# Patient Record
Sex: MALE | Race: WHITE | NOT HISPANIC OR LATINO | Employment: FULL TIME | URBAN - METROPOLITAN AREA
[De-identification: names, ages, dates, MRNs, and addresses within clinical notes are randomized per-mention and may not be internally consistent; named-entity substitution may affect disease eponyms.]

---

## 2017-09-08 ENCOUNTER — OFFICE VISIT (OUTPATIENT)
Dept: NEUROLOGY | Facility: MEDICAL CENTER | Age: 32
End: 2017-09-08
Payer: COMMERCIAL

## 2017-09-08 VITALS
HEIGHT: 72 IN | BODY MASS INDEX: 28.01 KG/M2 | RESPIRATION RATE: 16 BRPM | WEIGHT: 206.8 LBS | SYSTOLIC BLOOD PRESSURE: 114 MMHG | OXYGEN SATURATION: 97 % | DIASTOLIC BLOOD PRESSURE: 72 MMHG | TEMPERATURE: 98.3 F | HEART RATE: 82 BPM

## 2017-09-08 PROCEDURE — 99204 OFFICE O/P NEW MOD 45 MIN: CPT | Performed by: NURSE PRACTITIONER

## 2017-09-08 ASSESSMENT — PATIENT HEALTH QUESTIONNAIRE - PHQ9: CLINICAL INTERPRETATION OF PHQ2 SCORE: 0

## 2017-09-08 ASSESSMENT — PAIN SCALES - GENERAL: PAINLEVEL: NO PAIN

## 2017-09-08 NOTE — PROGRESS NOTES
Subjective:      Sanjeev Pop is a 31 y.o. male who presents with New Patient (Migraines)        HPI   History of headaches as child.  Missed a lot of school due to headaches.    Headaches are occurring at least 20 days of the month.     Typically triggered by stress but becomes stressed by the migraine in a reverse cycle.  Triggered by the sunshine as well.    Typical headache: no aura, located in the neck at onset.  Photophobia, phonophobia, short tempered.  Nausea, vomiting.    Prophylaxis: Topamax, amitriptyline, propranolol.    Abortive: ibuprofen 800mg tablet    Family history: PGF, father.    Medical history: none  Surgical history: none    Lives in [place], [place] no children.    Education: some college.  Occupation: quality and , full-time.    Caffeine: average one soda or iced tea.  Smokes: none  Alcohol: none    Current Outpatient Prescriptions   Medication Sig Dispense Refill   • Acetaminophen (TYLENOL) 325 MG Cap Take  by mouth.     • antipyrine-benzocaine (AURALGAN) otic solution Place 1-4 Drops in ear every 2 hours as needed for Mild Pain. (Patient not taking: Reported on 9/8/2017) 1 Bottle 0   • hydrocod polst-chlorphen polst (TUSSIONEX PENNKINETIC ER) 10-8 MG/5ML LQCR Take 5 mL by mouth every 12 hours. (Patient not taking: Reported on 9/8/2017) 60 mL 0   • ondansetron (ZOFRAN) 2 MG/ML injection 4 mg by Intravenous route Once.         No current facility-administered medications for this visit.          Review of Systems   Constitutional: Negative.    HENT: Negative for hearing loss, nosebleeds and sore throat.         No recent head injury.   Eyes: Negative for double vision.        No new loss of vision.   Respiratory: Negative for cough.         No recent lung infections.   Cardiovascular: Negative for chest pain.   Gastrointestinal: Negative for abdominal pain, diarrhea, nausea and vomiting.   Genitourinary: Negative.    Musculoskeletal: Negative.    Skin: Negative.    Neurological:  Positive for headaches. Negative for dizziness and seizures.   Endo/Heme/Allergies:        No history of endocrine dysfunction.  No new problems.   Psychiatric/Behavioral: Negative for depression. The patient is not nervous/anxious.         No recent mood changes.          Objective:     /72   Pulse 82   Temp 36.8 °C (98.3 °F)   Resp 16   Ht 1.829 m (6')   Wt 93.8 kg (206 lb 12.8 oz)   SpO2 97%   BMI 28.05 kg/m²      Physical Exam   Constitutional: He is oriented to person, place, and time. He appears well-developed and well-nourished. No distress.   HENT:   Head: Normocephalic and atraumatic.   Nose: Nose normal.   Eyes: Pupils are equal, round, and reactive to light.   Neck: Normal range of motion. Neck supple.   Cardiovascular: Normal rate and regular rhythm.  Exam reveals no gallop and no friction rub.    No murmur heard.  Pulmonary/Chest: Effort normal. No respiratory distress.   Abdominal: Soft.   Musculoskeletal: Normal range of motion.   Lymphadenopathy:     He has no cervical adenopathy.   Neurological: He is alert and oriented to person, place, and time. He exhibits normal muscle tone. Coordination normal.   Reflex Scores:       Tricep reflexes are 2+ on the right side and 2+ on the left side.       Bicep reflexes are 2+ on the right side and 2+ on the left side.       Brachioradialis reflexes are 2+ on the right side and 2+ on the left side.       Patellar reflexes are 2+ on the right side and 2+ on the left side.       Achilles reflexes are 2+ on the right side and 2+ on the left side.  Pleasant, right-handed.  CN II: Fundi normal, visual fields full to confrontation.  CN III, IV, VI: Pupils equal, round and reactive to light.  Extraocular movements full and intact in horizontal and vertical gaze.  CN V: Normal in motor and sensory modalities.  CN VII: No evidence of facial asymmetry.  CN VIII: Hearing grossly intact.  CN IX, X: Palate elevates symmetrically and in the midline.  CN XI:  Normal sternocleidomastoid strength.  CN XII: Tongue is in the midline.    Motor: Normal muscle bulk and tone, with full and symmetric strength.  Sensory: Intact to light touch and tickle.  Cerebellar/Coordination: Normal reach and grasp.  Gait: Stands well on toes.  Walks with assist.    Skin: Skin is warm and dry.   Psychiatric: He has a normal mood and affect.           Assessment/Plan:     Migraine headaches:  Long-standing history of headaches.  Occurring >20 days per month.  Unrelieved by OTC medications.    Neurological examination is normal and non-focal.    Ms Pop has chronic daily migraines, defined as having 15 or more headaches days per month over a minimum of the last three months. Episodes last more than 4 hours (if untreated). Previous treatments for at least three months have included beta-blockers such as propranolol, anti-epileptics such as Topamax, NSAIDs such as naproxen, anti-depressants such as  amitriptyline, and triptans. The patients has not responded to any of these treatments. At this point the only option left would be to use BTX injections for chronic migraine.    Return for follow-up for initiation of Botox.

## 2017-09-11 ASSESSMENT — ENCOUNTER SYMPTOMS
CONSTITUTIONAL NEGATIVE: 1
ABDOMINAL PAIN: 0
DEPRESSION: 0
COUGH: 0
DOUBLE VISION: 0
DIZZINESS: 0
NAUSEA: 0
HEADACHES: 1
NERVOUS/ANXIOUS: 0
SORE THROAT: 0
SEIZURES: 0
VOMITING: 0
DIARRHEA: 0
MUSCULOSKELETAL NEGATIVE: 1

## 2017-09-18 DIAGNOSIS — G43.709 CHRONIC MIGRAINE W/O AURA W/O STATUS MIGRAINOSUS, NOT INTRACTABLE: ICD-10-CM

## 2017-11-17 ENCOUNTER — OFFICE VISIT (OUTPATIENT)
Dept: NEUROLOGY | Facility: MEDICAL CENTER | Age: 32
End: 2017-11-17
Payer: COMMERCIAL

## 2017-11-17 VITALS
DIASTOLIC BLOOD PRESSURE: 78 MMHG | HEART RATE: 83 BPM | OXYGEN SATURATION: 98 % | TEMPERATURE: 98.6 F | HEIGHT: 72 IN | WEIGHT: 221.4 LBS | BODY MASS INDEX: 29.99 KG/M2 | SYSTOLIC BLOOD PRESSURE: 100 MMHG

## 2017-11-17 DIAGNOSIS — G43.709 CHRONIC MIGRAINE W/O AURA W/O STATUS MIGRAINOSUS, NOT INTRACTABLE: ICD-10-CM

## 2017-11-17 PROCEDURE — 64615 CHEMODENERV MUSC MIGRAINE: CPT | Performed by: PHYSICIAN ASSISTANT

## 2017-11-17 NOTE — PROGRESS NOTES
I treated this patient in clinic today with BotoxA 155 units according to the dosing/injection paradigm currently mandated by the FDA for the management of chronic migraine. Specifically, I injected 5 units to the procerus, 5 units to the corrugators bilaterally, a total of 20 units to the frontalis musculature, 20 units to the temporalis bilaterally, 15 units to the occipitalis bilaterally, 10 units to the cervical paraspinals bilaterally and 15 units to the trapezius musculature bilaterally.      The remainder of the Botox 200 units mixed but not administered was discarded as wastage per FDA guidelines.

## 2018-03-02 ENCOUNTER — OFFICE VISIT (OUTPATIENT)
Dept: NEUROLOGY | Facility: MEDICAL CENTER | Age: 33
End: 2018-03-02
Payer: COMMERCIAL

## 2018-03-02 VITALS
HEIGHT: 72 IN | OXYGEN SATURATION: 95 % | TEMPERATURE: 98.6 F | SYSTOLIC BLOOD PRESSURE: 110 MMHG | BODY MASS INDEX: 30.31 KG/M2 | HEART RATE: 88 BPM | DIASTOLIC BLOOD PRESSURE: 64 MMHG | WEIGHT: 223.77 LBS

## 2018-03-02 DIAGNOSIS — G43.709 CHRONIC MIGRAINE W/O AURA W/O STATUS MIGRAINOSUS, NOT INTRACTABLE: ICD-10-CM

## 2018-03-02 PROCEDURE — 64615 CHEMODENERV MUSC MIGRAINE: CPT | Performed by: PHYSICIAN ASSISTANT

## 2018-03-02 PROCEDURE — 99214 OFFICE O/P EST MOD 30 MIN: CPT | Performed by: PHYSICIAN ASSISTANT

## 2018-03-02 RX ORDER — PROMETHAZINE HYDROCHLORIDE 25 MG/1
TABLET ORAL
Qty: 20 TAB | Refills: 11 | Status: SHIPPED | OUTPATIENT
Start: 2018-03-02 | End: 2020-02-17

## 2018-03-02 NOTE — PROGRESS NOTES
I treated this patient in clinic today with BotoxA 155 units according to the dosing/injection paradigm currently mandated by the FDA for the management of chronic migraine. Specifically, I injected 5 units to the procerus, 5 units to the corrugators bilaterally, a total of 20 units to the frontalis musculature, 20 units to the temporalis bilaterally, 15 units to the occipitalis bilaterally, 10 units to the cervical paraspinals bilaterally and 15 units to the trapezius musculature bilaterally.    Pt was advised of side effects associated with medication.    I also injected an additional 10 units into the occipital belly of the occipitofrontalis each side because pt complained of reduced efficacy or effect wearing off prior to next dose.    The remainder of the Botox 200 units mixed but not administered was discarded as wastage per FDA guidelines.

## 2018-03-02 NOTE — PATIENT INSTRUCTIONS
Acute/Rescue Medications: max use 9 days monthly - use calendar to track and bring to next visit    Triptan - try an injectible formulation - I will try and get zembrace 3 mg.  Try it at onset of neck pain.  If it isn't working within 30 minutes repeat dose and next time do 6 mg (two injections) at onset and call and tell us so we can switch the prescription  Phenergan 25 mg + 25 mg benadryl when you can sleep/knock yourself out to sleep off a headache  NSAID - OK to also take some ibuprofen or excedrin migraine  Topical medication to put on neck when it begins to flare up    Daily Preventative Treatment:  Vitamins - handout provided - begin one or all three of them  Daily medicine - to consider possibly in the future  Exercise program:      Other:  ONB and/or Trigger point injection - we will obtain authorization and call you to schedule.  Future injections can be done same day/same time as botox

## 2018-03-02 NOTE — PROGRESS NOTES
Subjective:      Sanjeev Pop is a 32 y.o. male who presents with Follow-Up (NU2U migraines)    Chief Complaint/Reason for referral:  headaches    History of Present Illness:   Describe your headaches to me: migraine headaches since elementary school.  Now migraines typically right back of head - always there.  Moderate to severe intensity, pulsing and throbbing pain, nausea and vomiting, light and sound sensitivity.  Worsened with activity and lasting more than 4 hours untreated.    Do you get an aura or any symptoms that typically begin PRIOR to headache onset?  none    Are you nauseated or sick to your stomach when you have a headache?  y  Does light bother you when you have a headache?   ____y_____  Does sound or noise bother you when you have a headache?   ____y_____    Neurologic symptoms with headaches (weakness, numbness, vertigo, speech changes, cognitive changes)  none      Do you have any neck or jaw pain with headaches?    No hx of neck injuries    No jaw issues    Have you identified any triggers for your headaches (dehydration, poor sleep, low blood sugar, alcohol 35% (tristan wine) chocolate 22%, cheese 9%, citrus fruit 11%)? Stress, worry, no food triggers    Do you feel restless like you want to pace around with your headaches or do you feel like lying down to make your headache feel better/less severe?  none    Do headaches start by coughing, sneezing, bending over, Valsalva maneuver, sexual activity?  none      Do headaches start shortly after you lie down to go to bed or shortly after you get up from bed in the morning?  random    Have you noticed a menstrual pattern to your headaches? n/a    Have you ever kept a headache diary?    How many days do you keep ANY type of headache in any given month?  3 or fewer days______   Between 3 and 6 days______   Between 6 and 10 days_____  Between 11 and 14 days____  15 or more headache/days per month__X___   No change yet but only one set of botox  Has severe  headaches _8___ days per month    Family members with headaches:  none    Co--morbid conditions:    Conditions that affect diagnosis and treatment:  Depression  N        Anxiety     Yes when migraine is starting        Sleep disorders:   N       Obesity  Yes BMI 30    History of TBI N            Pregnancy/family planning   N/A    Fibromyalgia   N    Social History:  Do you drink any caffeine? Yes__X___ No____   How many days per week?  2 or fewer days______  3 or more days__X____      Do you drink alcohol?  None    Do you use recreational drugs including medicinal marijuana?  none    Do you smoke cigarettes? none    What do you do for work?    at [employer]  How do your headaches affect your ability to work?  Yes lots of days    Who and where do you live? Lives alone    What is your exercise program: gym 5 times weekly    Have you had an MRI done?  Not sure    [facility] reviewed - no asthma, no kidney stones, no MI, no stroke    Medications and Allergies Reviewed     What are you taking right now for your headaches/#days per month of acute medication use:   Takes ibuprofen 800 3-4 x weekly    Tylenol  ibuprofen    Prior acute treatments:  Medication/dose/timing/route/worked or side-effects?    imitrex  maxalt  Never  Tried cambia  migranal tried  Not sure if he tried triptan nasal  Had an epipen not sure what it was but probably sumatriptan    What are you taking right now - daily - to prevent headaches?/dose    nothing    Any prior prophylactic treatments:  Medications/dose/frequency/duration of treatment/worked or side effects?    Started botox by paying for it out of pocket  Never tried a daily medicine to reduce headache frequency  Never tried                                                                                                                      ROS       Objective:     There were no vitals taken for this visit.     Physical Exam            Assessment/Plan:     Chronic  Migraine/Migraine without Aura intractable    Acute/Rescue Medications: max use 9 days monthly - use calendar to track and bring to next visit    Triptan - try an injectible formulation - I will try and get zembrace 3 mg.  Try it at onset of neck pain.  If it isn't working within 30 minutes repeat dose and next time do 6 mg (two injections) at onset and call and tell us so we can switch the prescription  Phenergan 25 mg + 25 mg benadryl when you can sleep/knock yourself out to sleep off a headache  NSAID - OK to also take some ibuprofen or excedrin migraine  Topical medication to put on neck when it begins to flare up    Daily Preventative Treatment:  Vitamins - handout provided - begin one or all three of them  Daily medicine - to consider possibly in the future    Other:  ONB and/or Trigger point injection - we will obtain authorization and call you to schedule.  Future injections can be done same day/same time as botox    Total time with this visit:   30  Minutes face-to-face with patient. More than 50% of this visit was spent educating patient on their illness and/or coordinating care, as detailed above

## 2019-03-29 ENCOUNTER — OFFICE VISIT (OUTPATIENT)
Dept: URGENT CARE | Facility: PHYSICIAN GROUP | Age: 34
End: 2019-03-29
Payer: COMMERCIAL

## 2019-03-29 ENCOUNTER — HOSPITAL ENCOUNTER (OUTPATIENT)
Facility: MEDICAL CENTER | Age: 34
End: 2019-03-29
Attending: NURSE PRACTITIONER
Payer: COMMERCIAL

## 2019-03-29 VITALS
SYSTOLIC BLOOD PRESSURE: 124 MMHG | HEART RATE: 91 BPM | WEIGHT: 225 LBS | TEMPERATURE: 98.7 F | DIASTOLIC BLOOD PRESSURE: 86 MMHG | BODY MASS INDEX: 30.48 KG/M2 | OXYGEN SATURATION: 96 % | HEIGHT: 72 IN | RESPIRATION RATE: 16 BRPM

## 2019-03-29 DIAGNOSIS — M25.50 MULTIPLE JOINT PAIN: ICD-10-CM

## 2019-03-29 DIAGNOSIS — M25.50 ARTHRALGIA, UNSPECIFIED JOINT: ICD-10-CM

## 2019-03-29 PROCEDURE — 85652 RBC SED RATE AUTOMATED: CPT

## 2019-03-29 PROCEDURE — 86038 ANTINUCLEAR ANTIBODIES: CPT

## 2019-03-29 PROCEDURE — 99214 OFFICE O/P EST MOD 30 MIN: CPT | Performed by: NURSE PRACTITIONER

## 2019-03-29 RX ORDER — KETOROLAC TROMETHAMINE 30 MG/ML
60 INJECTION, SOLUTION INTRAMUSCULAR; INTRAVENOUS ONCE
Status: COMPLETED | OUTPATIENT
Start: 2019-03-29 | End: 2019-03-29

## 2019-03-29 RX ORDER — METHYLPREDNISOLONE 4 MG/1
4 TABLET ORAL DAILY
Qty: 1 KIT | Refills: 0 | Status: SHIPPED | OUTPATIENT
Start: 2019-03-29 | End: 2020-02-17

## 2019-03-29 RX ORDER — IBUPROFEN 800 MG/1
TABLET ORAL
Refills: 3 | COMMUNITY
Start: 2019-03-21 | End: 2019-04-26

## 2019-03-29 RX ADMIN — KETOROLAC TROMETHAMINE 60 MG: 30 INJECTION, SOLUTION INTRAMUSCULAR; INTRAVENOUS at 18:14

## 2019-03-29 ASSESSMENT — ENCOUNTER SYMPTOMS
FEVER: 0
VOMITING: 0
PAIN: 1
SHORTNESS OF BREATH: 0
EYE PAIN: 0
DIZZINESS: 0
MYALGIAS: 0
NUMBNESS: 0
NAUSEA: 0
WEAKNESS: 0
HEADACHES: 0
JOINT SWELLING: 0
CHILLS: 0
SORE THROAT: 0
FATIGUE: 0

## 2019-03-29 NOTE — PROGRESS NOTES
"Subjective:   Sanjeev Pop is a 33 y.o. male who presents for Pain (patient complain of joint pain for over a month)  Patient is a 33-year-old male presents clinic today for evaluation of multiple joint pain has persisted for the past month.  Patient complaining of bilateral knee pain, right elbow pain, ankle pain.  He denies any mechanism of injury.  Patient states that pain is exacerbated not with sitting and/or walking however when he stands he feels a stretching pain behind his knee.  He has been taking ibuprofen with no relief in symptoms.  Patient states he has been active in which he used to run on a treadmill and unable to run any longer.        Pain   This is a new problem. The current episode started more than 1 month ago. The problem occurs constantly. The problem has been unchanged. Pertinent negatives include no chest pain, chills, congestion, fatigue, fever, headaches, joint swelling, myalgias, nausea, numbness, rash, sore throat, vomiting or weakness. Associated symptoms comments: Bilateral knee pain, ankle pain and right elbow pain  . The symptoms are aggravated by standing. He has tried NSAIDs for the symptoms. The treatment provided no relief.     Review of Systems   Constitutional: Negative for chills, fatigue and fever.   HENT: Negative for congestion and sore throat.    Eyes: Negative for pain.   Respiratory: Negative for shortness of breath.    Cardiovascular: Negative for chest pain.   Gastrointestinal: Negative for nausea and vomiting.   Genitourinary: Negative for hematuria.   Musculoskeletal: Positive for joint pain. Negative for joint swelling and myalgias.   Skin: Negative for rash.   Neurological: Negative for dizziness, weakness, numbness and headaches.     Allergies   Allergen Reactions   • Pcn [Penicillins]       Objective:   /86   Pulse 91   Temp 37.1 °C (98.7 °F) (Temporal)   Resp 16   Ht 1.829 m (6' 0.01\")   Wt 102.1 kg (225 lb)   SpO2 96%   BMI 30.51 kg/m² "   Physical Exam   Constitutional: He is oriented to person, place, and time. He appears well-developed and well-nourished. No distress.   HENT:   Head: Normocephalic and atraumatic.   Eyes: Pupils are equal, round, and reactive to light. Conjunctivae and EOM are normal.   Cardiovascular: Normal rate and regular rhythm.    No murmur heard.  Pulmonary/Chest: Effort normal and breath sounds normal. No respiratory distress.   Abdominal: Soft. He exhibits no distension. There is no tenderness.   Musculoskeletal:        Right elbow: He exhibits normal range of motion and no swelling. No tenderness found. No radial head, no medial epicondyle, no lateral epicondyle and no olecranon process tenderness noted.        Right knee: He exhibits normal range of motion, no swelling, normal patellar mobility and no bony tenderness. No tenderness found. No medial joint line and no lateral joint line tenderness noted.        Left knee: He exhibits normal range of motion, no swelling and normal patellar mobility. No tenderness found. No medial joint line and no lateral joint line tenderness noted.        Right ankle: He exhibits normal range of motion and no swelling. No tenderness. No lateral malleolus tenderness found. Achilles tendon normal.        Left ankle: He exhibits normal range of motion and no swelling. No tenderness. No lateral malleolus and no medial malleolus tenderness found. Achilles tendon normal.   Neurological: He is alert and oriented to person, place, and time. He has normal reflexes. No sensory deficit.   Skin: Skin is warm and dry.   Psychiatric: He has a normal mood and affect.         Assessment/Plan:     1. Arthralgia, unspecified joint  REFERRAL TO SPORTS MEDICINE    ketorolac (TORADOL) injection 60 mg    MethylPREDNISolone (MEDROL DOSEPAK) 4 MG Tablet Therapy Pack   2. Multiple joint pain  CLAUS    WESTERGREN SED RATE     Physical exam unremarkable.  Gait normal.  Patient without erythema, no swelling do not  suspect gout.  Will check labs for CLAUS to rule out multiple joint pain for autoimmune deficiency which does seem to be unlikely given the presentation..  Will check sed rate for inflammatory response.  Advised patient to continue taking ibuprofen as needed.  Will place referral for sports medicine follow-up if symptoms persist and/or worsen.  Patient given precautionary s/sx that mandate immediate follow up and evaluation in the ED. Advised of risks of not doing so.    DDX, Supportive care, and indications for immediate follow-up discussed with patient.    Instructed to return to clinic or nearest emergency department if we are not available for any change in condition, further concerns, or worsening of symptoms.    The patient demonstrated a good understanding and agreed with the treatment plan.

## 2019-03-30 DIAGNOSIS — M25.50 MULTIPLE JOINT PAIN: ICD-10-CM

## 2019-03-30 LAB
AMBIGUOUS DTTM AMBI4: NORMAL
ERYTHROCYTE [SEDIMENTATION RATE] IN BLOOD BY WESTERGREN METHOD: 2 MM/HOUR (ref 0–15)

## 2019-04-01 LAB — NUCLEAR IGG SER QL IA: NORMAL

## 2019-04-04 ENCOUNTER — TELEPHONE (OUTPATIENT)
Dept: URGENT CARE | Facility: CLINIC | Age: 34
End: 2019-04-04

## 2019-04-09 ENCOUNTER — HOSPITAL ENCOUNTER (OUTPATIENT)
Dept: LAB | Facility: MEDICAL CENTER | Age: 34
End: 2019-04-09
Attending: FAMILY MEDICINE
Payer: COMMERCIAL

## 2019-04-09 ENCOUNTER — APPOINTMENT (OUTPATIENT)
Dept: RADIOLOGY | Facility: IMAGING CENTER | Age: 34
End: 2019-04-09
Attending: FAMILY MEDICINE
Payer: COMMERCIAL

## 2019-04-09 ENCOUNTER — OFFICE VISIT (OUTPATIENT)
Dept: MEDICAL GROUP | Facility: CLINIC | Age: 34
End: 2019-04-09
Payer: COMMERCIAL

## 2019-04-09 VITALS
SYSTOLIC BLOOD PRESSURE: 122 MMHG | OXYGEN SATURATION: 97 % | RESPIRATION RATE: 18 BRPM | BODY MASS INDEX: 30.48 KG/M2 | DIASTOLIC BLOOD PRESSURE: 78 MMHG | TEMPERATURE: 98.9 F | HEIGHT: 72 IN | WEIGHT: 225 LBS | HEART RATE: 78 BPM

## 2019-04-09 DIAGNOSIS — G89.29 CHRONIC BILATERAL LOW BACK PAIN WITHOUT SCIATICA: ICD-10-CM

## 2019-04-09 DIAGNOSIS — M54.50 CHRONIC BILATERAL LOW BACK PAIN WITHOUT SCIATICA: ICD-10-CM

## 2019-04-09 DIAGNOSIS — R10.2 PELVIC PAIN: ICD-10-CM

## 2019-04-09 DIAGNOSIS — H15.003 BILATERAL SCLERITIS: ICD-10-CM

## 2019-04-09 LAB
ALP SERPL-CCNC: 83 U/L (ref 30–99)
RHEUMATOID FACT SER IA-ACNC: <10 IU/ML (ref 0–14)

## 2019-04-09 PROCEDURE — 86431 RHEUMATOID FACTOR QUANT: CPT

## 2019-04-09 PROCEDURE — 99203 OFFICE O/P NEW LOW 30 MIN: CPT | Performed by: FAMILY MEDICINE

## 2019-04-09 PROCEDURE — 36415 COLL VENOUS BLD VENIPUNCTURE: CPT

## 2019-04-09 PROCEDURE — 84075 ASSAY ALKALINE PHOSPHATASE: CPT

## 2019-04-09 PROCEDURE — 72170 X-RAY EXAM OF PELVIS: CPT | Mod: TC | Performed by: FAMILY MEDICINE

## 2019-04-09 PROCEDURE — 86812 HLA TYPING A B OR C: CPT

## 2019-04-09 PROCEDURE — 86200 CCP ANTIBODY: CPT

## 2019-04-09 RX ORDER — PREDNISONE 5 MG/1
5 TABLET ORAL 2 TIMES DAILY PRN
Qty: 60 TAB | Refills: 1 | Status: SHIPPED | OUTPATIENT
Start: 2019-04-09 | End: 2020-02-17

## 2019-04-09 ASSESSMENT — ENCOUNTER SYMPTOMS
DIZZINESS: 0
NAUSEA: 0
VOMITING: 0
CHILLS: 0
FEVER: 0
SHORTNESS OF BREATH: 0

## 2019-04-09 NOTE — PROGRESS NOTES
"Subjective:      Sanjeev Pop is a 33 y.o. male who presents with Back Pain (Referral from / Back pain )     Referred by WILMA Calix for evaluation of LUMBAR spine and BILATERAL extremity pain    HPI   LUMBAR spine and BILATERAL extremity pain  Insidious onset without injury  Patient expresses feeling generalized achiness as if he was in a long car ride  Symptoms have been getting worse  Recently started years ago, worse over the past 2 weeks (mid March 2019)  Pain is achy almost like a sensation of muscle soreness  Pain is at the lumbar spine, gluteal and hamstring regions as well as the popliteal fossa region  Improved with ambulation slightly  Worse with sitting for long periods of time or lying down  Worse in the mornings, warms up and he feels better and then at the end of the day he is bad again  Has tried ibuprofen which does not help  He was also prescribed Medrol Dosepak at urgent care which helped while he was on it    No dysuria  No visual disturbances  \"eyes\" become red due to sun and leads to migraines      manage, ambulating and desk work    Review of Systems   Constitutional: Negative for chills and fever.   Respiratory: Negative for shortness of breath.    Cardiovascular: Negative for chest pain.   Gastrointestinal: Negative for nausea and vomiting.   Neurological: Negative for dizziness.     PMH:  has a past medical history of Head ache; Headache(784.0); and Migraine.  MEDS:   Current Outpatient Prescriptions:   •  ibuprofen (MOTRIN) 800 MG Tab, , Disp: , Rfl: 3  •  MethylPREDNISolone (MEDROL DOSEPAK) 4 MG Tablet Therapy Pack, Take 1 Tab by mouth every day. (Patient not taking: Reported on 4/9/2019), Disp: 1 Kit, Rfl: 0  •  SUMAtriptan Succinate 3 MG/0.5ML Solution Auto-injector, Inject 1 PEN as instructed as needed (onset migraine)., Disp: 12 PEN, Rfl: 11  •  promethazine (PHENERGAN) 25 MG Tab, 25 mg phenergan +/- 25 mg benadryl once on day with severe " "migraine.  Will cause sleepiness.  Do not drive., Disp: 20 Tab, Rfl: 11  •  Acetaminophen (TYLENOL) 325 MG Cap, Take  by mouth., Disp: , Rfl:   ALLERGIES:   Allergies   Allergen Reactions   • Pcn [Penicillins]      SURGHX: History reviewed. No pertinent surgical history.  SOCHX:  reports that he has never smoked. He has never used smokeless tobacco.  FH: Family history was reviewed, no pertinent findings to report     Objective:     /78 (BP Location: Right arm, Patient Position: Sitting, BP Cuff Size: Adult)   Pulse 78   Temp 37.2 °C (98.9 °F) (Temporal)   Resp 18   Ht 1.829 m (6' 0.01\")   Wt 102.1 kg (225 lb)   SpO2 97%   BMI 30.51 kg/m²      Physical Exam     Lumbar spine exam:  No acute distress  Able to walk on heels and toes  Able to flex to 90° with MILD discomfort  Extension and lateral rotation with MILD discomfort  Strength testing with hip flexion, knee flexion and extension, ankle dorsiflexion and plantarflexion, and EHL testing were 5 out of 5 bilaterally  Sensation was intact bilaterally  The legs were otherwise neurovascularly intact  Slump test produces PAIN BILATERALLY behind the knees     Assessment/Plan:     1. Pelvic pain  DX-PELVIS-1 OR 2 VIEWS    URIC A+CLAUS+RA QN+CRP+ASO    CCP ANTIBODY    HLA-B27    ALKALINE PHOSPHATASE    predniSONE (DELTASONE) 5 MG Tab   2. Chronic bilateral low back pain without sciatica  URIC A+CLAUS+RA QN+CRP+ASO    RHEUMATOID ARTHRITIS FACTOR    predniSONE (DELTASONE) 5 MG Tab   3. Bilateral scleritis  predniSONE (DELTASONE) 5 MG Tab     Chronic history of low back pain with SI joint region pain  History of migraines with \"sensitivity to sun and red eyes closed\"    Presentation suspicious for ankylosing spondylitis given scleritis, chronic low back pain with what seems like sacroiliitis as well as positive response to oral steroid provided at urgent care    CRP, RF, anti-CCP, uric acid, HLA-B27, alk phos   pelvis x-ray          4/9/2019 2:31 " PM    HISTORY/REASON FOR EXAM:  Atraumatic Pelvic/Hip Pain.  Low back and pelvic pain for 2 weeks.    TECHNIQUE/EXAM DESCRIPTION AND NUMBER OF VIEWS:  1 view(s) of the pelvis.    COMPARISON:  None.    FINDINGS:  Pelvis and sacrum are intact.  Reflux proximal femurs are intact.  No gross soft tissue abnormality.   Impression       No pelvic fracture.     Thank you WILMA Calix for allowing me to participate in caring for your patient.

## 2019-04-11 LAB — HLA-B27 QL FC: POSITIVE

## 2019-04-12 LAB — CCP IGG SERPL-ACNC: 18 UNITS (ref 0–19)

## 2019-04-22 ENCOUNTER — TELEPHONE (OUTPATIENT)
Dept: MEDICAL GROUP | Facility: CLINIC | Age: 34
End: 2019-04-22

## 2019-04-22 DIAGNOSIS — M45.7 ANKYLOSING SPONDYLITIS OF LUMBOSACRAL REGION (HCC): ICD-10-CM

## 2019-04-22 RX ORDER — NAPROXEN 500 MG/1
500 TABLET ORAL 2 TIMES DAILY WITH MEALS
Qty: 60 TAB | Refills: 3 | Status: SHIPPED | OUTPATIENT
Start: 2019-04-22 | End: 2019-04-26

## 2019-04-23 NOTE — TELEPHONE ENCOUNTER
"   1. Ankylosing spondylitis of lumbosacral region (HCC)  naproxen (NAPROSYN) 500 MG Tab    REFERRAL TO RHEUMATOLOGY     Attempt to contact patient  \"mailbox full\"     PLAN:  POSITIVE HLA-B27 together with symptoms is suggestive of ankylosing spondylitis    START NAPROXEN 500 mg BID with food  REFERRAL for rheumatology placed (may take up to 1 year to establish with rheumatology, but recommend following through)    Recommend following up after taking naproxen for 2-3 weeks to see how he is doing  "

## 2019-04-26 ENCOUNTER — TELEPHONE (OUTPATIENT)
Dept: MEDICAL GROUP | Facility: CLINIC | Age: 34
End: 2019-04-26

## 2019-04-26 DIAGNOSIS — M45.7 ANKYLOSING SPONDYLITIS OF LUMBOSACRAL REGION (HCC): ICD-10-CM

## 2019-04-26 RX ORDER — MELOXICAM 7.5 MG/1
7.5 TABLET ORAL DAILY
Qty: 30 TAB | Refills: 2 | Status: SHIPPED | OUTPATIENT
Start: 2019-04-26 | End: 2020-02-17

## 2019-04-27 NOTE — TELEPHONE ENCOUNTER
1. Ankylosing spondylitis of lumbosacral region (HCC)  meloxicam (MOBIC) 7.5 MG Tab       Spoke with patient via phone  Advised of POSITIVE HLA-B27 finding  With his picture, likely ankylosing spondylitis    Referral for rheumatology evaluation  Start meloxicam

## 2020-02-17 ENCOUNTER — HOSPITAL ENCOUNTER (EMERGENCY)
Facility: MEDICAL CENTER | Age: 35
End: 2020-02-17
Attending: EMERGENCY MEDICINE
Payer: COMMERCIAL

## 2020-02-17 ENCOUNTER — APPOINTMENT (OUTPATIENT)
Dept: RADIOLOGY | Facility: MEDICAL CENTER | Age: 35
End: 2020-02-17
Attending: EMERGENCY MEDICINE
Payer: COMMERCIAL

## 2020-02-17 VITALS
OXYGEN SATURATION: 98 % | TEMPERATURE: 97.9 F | BODY MASS INDEX: 32.13 KG/M2 | HEIGHT: 71 IN | DIASTOLIC BLOOD PRESSURE: 73 MMHG | RESPIRATION RATE: 19 BRPM | WEIGHT: 229.5 LBS | SYSTOLIC BLOOD PRESSURE: 133 MMHG | HEART RATE: 75 BPM

## 2020-02-17 DIAGNOSIS — R10.31 RIGHT LOWER QUADRANT ABDOMINAL PAIN: ICD-10-CM

## 2020-02-17 DIAGNOSIS — N20.0 RENAL CALCULUS OR STONE: ICD-10-CM

## 2020-02-17 LAB
ALBUMIN SERPL BCP-MCNC: 4.4 G/DL (ref 3.2–4.9)
ALBUMIN/GLOB SERPL: 1.9 G/DL
ALP SERPL-CCNC: 74 U/L (ref 30–99)
ALT SERPL-CCNC: 25 U/L (ref 2–50)
ANION GAP SERPL CALC-SCNC: 12 MMOL/L (ref 7–16)
APPEARANCE UR: CLEAR
AST SERPL-CCNC: 24 U/L (ref 12–45)
BACTERIA #/AREA URNS HPF: NEGATIVE /HPF
BASOPHILS # BLD AUTO: 0.5 % (ref 0–1.8)
BASOPHILS # BLD: 0.05 K/UL (ref 0–0.12)
BILIRUB SERPL-MCNC: 0.8 MG/DL (ref 0.1–1.5)
BILIRUB UR QL STRIP.AUTO: NEGATIVE
BUN SERPL-MCNC: 15 MG/DL (ref 8–22)
CALCIUM SERPL-MCNC: 8.9 MG/DL (ref 8.4–10.2)
CHLORIDE SERPL-SCNC: 105 MMOL/L (ref 96–112)
CO2 SERPL-SCNC: 22 MMOL/L (ref 20–33)
COLOR UR: YELLOW
CREAT SERPL-MCNC: 1.19 MG/DL (ref 0.5–1.4)
EOSINOPHIL # BLD AUTO: 0.06 K/UL (ref 0–0.51)
EOSINOPHIL NFR BLD: 0.7 % (ref 0–6.9)
EPI CELLS #/AREA URNS HPF: ABNORMAL /HPF
ERYTHROCYTE [DISTWIDTH] IN BLOOD BY AUTOMATED COUNT: 37.1 FL (ref 35.9–50)
GLOBULIN SER CALC-MCNC: 2.3 G/DL (ref 1.9–3.5)
GLUCOSE SERPL-MCNC: 120 MG/DL (ref 65–99)
GLUCOSE UR STRIP.AUTO-MCNC: NEGATIVE MG/DL
HCT VFR BLD AUTO: 45.7 % (ref 42–52)
HGB BLD-MCNC: 16.4 G/DL (ref 14–18)
IMM GRANULOCYTES # BLD AUTO: 0.06 K/UL (ref 0–0.11)
IMM GRANULOCYTES NFR BLD AUTO: 0.7 % (ref 0–0.9)
KETONES UR STRIP.AUTO-MCNC: NEGATIVE MG/DL
LEUKOCYTE ESTERASE UR QL STRIP.AUTO: NEGATIVE
LIPASE SERPL-CCNC: 23 U/L (ref 7–58)
LYMPHOCYTES # BLD AUTO: 1.57 K/UL (ref 1–4.8)
LYMPHOCYTES NFR BLD: 17.2 % (ref 22–41)
MCH RBC QN AUTO: 29.9 PG (ref 27–33)
MCHC RBC AUTO-ENTMCNC: 35.9 G/DL (ref 33.7–35.3)
MCV RBC AUTO: 83.2 FL (ref 81.4–97.8)
MICRO URNS: ABNORMAL
MONOCYTES # BLD AUTO: 0.71 K/UL (ref 0–0.85)
MONOCYTES NFR BLD AUTO: 7.8 % (ref 0–13.4)
NEUTROPHILS # BLD AUTO: 6.68 K/UL (ref 1.82–7.42)
NEUTROPHILS NFR BLD: 73.1 % (ref 44–72)
NITRITE UR QL STRIP.AUTO: NEGATIVE
NRBC # BLD AUTO: 0 K/UL
NRBC BLD-RTO: 0 /100 WBC
PH UR STRIP.AUTO: 5.5 [PH] (ref 5–8)
PLATELET # BLD AUTO: 204 K/UL (ref 164–446)
PMV BLD AUTO: 10.8 FL (ref 9–12.9)
POTASSIUM SERPL-SCNC: 4.1 MMOL/L (ref 3.6–5.5)
PROT SERPL-MCNC: 6.7 G/DL (ref 6–8.2)
PROT UR QL STRIP: NEGATIVE MG/DL
RBC # BLD AUTO: 5.49 M/UL (ref 4.7–6.1)
RBC # URNS HPF: ABNORMAL /HPF
RBC UR QL AUTO: ABNORMAL
SODIUM SERPL-SCNC: 139 MMOL/L (ref 135–145)
SP GR UR STRIP.AUTO: 1.01
WBC # BLD AUTO: 9.1 K/UL (ref 4.8–10.8)
WBC #/AREA URNS HPF: ABNORMAL /HPF

## 2020-02-17 PROCEDURE — 83690 ASSAY OF LIPASE: CPT

## 2020-02-17 PROCEDURE — 81001 URINALYSIS AUTO W/SCOPE: CPT

## 2020-02-17 PROCEDURE — 85025 COMPLETE CBC W/AUTO DIFF WBC: CPT

## 2020-02-17 PROCEDURE — 96376 TX/PRO/DX INJ SAME DRUG ADON: CPT

## 2020-02-17 PROCEDURE — 700117 HCHG RX CONTRAST REV CODE 255: Performed by: EMERGENCY MEDICINE

## 2020-02-17 PROCEDURE — 700105 HCHG RX REV CODE 258: Performed by: EMERGENCY MEDICINE

## 2020-02-17 PROCEDURE — 99285 EMERGENCY DEPT VISIT HI MDM: CPT

## 2020-02-17 PROCEDURE — 36415 COLL VENOUS BLD VENIPUNCTURE: CPT

## 2020-02-17 PROCEDURE — 80053 COMPREHEN METABOLIC PANEL: CPT

## 2020-02-17 PROCEDURE — 700111 HCHG RX REV CODE 636 W/ 250 OVERRIDE (IP): Performed by: EMERGENCY MEDICINE

## 2020-02-17 PROCEDURE — 96374 THER/PROPH/DIAG INJ IV PUSH: CPT

## 2020-02-17 PROCEDURE — 96375 TX/PRO/DX INJ NEW DRUG ADDON: CPT

## 2020-02-17 PROCEDURE — 74177 CT ABD & PELVIS W/CONTRAST: CPT

## 2020-02-17 RX ORDER — SODIUM CHLORIDE 9 MG/ML
1000 INJECTION, SOLUTION INTRAVENOUS ONCE
Status: COMPLETED | OUTPATIENT
Start: 2020-02-17 | End: 2020-02-17

## 2020-02-17 RX ORDER — ONDANSETRON 4 MG/1
4 TABLET, ORALLY DISINTEGRATING ORAL EVERY 6 HOURS PRN
Qty: 10 TAB | Refills: 0 | Status: SHIPPED | OUTPATIENT
Start: 2020-02-17

## 2020-02-17 RX ORDER — TAMSULOSIN HYDROCHLORIDE 0.4 MG/1
0.4 CAPSULE ORAL DAILY
Qty: 20 CAP | Refills: 0 | Status: SHIPPED | OUTPATIENT
Start: 2020-02-17

## 2020-02-17 RX ORDER — MORPHINE SULFATE 4 MG/ML
4 INJECTION, SOLUTION INTRAMUSCULAR; INTRAVENOUS ONCE
Status: COMPLETED | OUTPATIENT
Start: 2020-02-17 | End: 2020-02-17

## 2020-02-17 RX ORDER — OXYCODONE HYDROCHLORIDE AND ACETAMINOPHEN 5; 325 MG/1; MG/1
1 TABLET ORAL EVERY 6 HOURS PRN
Qty: 20 TAB | Refills: 0 | Status: SHIPPED | OUTPATIENT
Start: 2020-02-17 | End: 2020-02-22

## 2020-02-17 RX ORDER — KETOROLAC TROMETHAMINE 30 MG/ML
30 INJECTION, SOLUTION INTRAMUSCULAR; INTRAVENOUS ONCE
Status: COMPLETED | OUTPATIENT
Start: 2020-02-17 | End: 2020-02-17

## 2020-02-17 RX ORDER — ONDANSETRON 2 MG/ML
4 INJECTION INTRAMUSCULAR; INTRAVENOUS ONCE
Status: COMPLETED | OUTPATIENT
Start: 2020-02-17 | End: 2020-02-17

## 2020-02-17 RX ORDER — IBUPROFEN 800 MG/1
800 TABLET ORAL EVERY 8 HOURS PRN
Qty: 30 TAB | Refills: 1 | Status: SHIPPED | OUTPATIENT
Start: 2020-02-17

## 2020-02-17 RX ADMIN — MORPHINE SULFATE 4 MG: 4 INJECTION INTRAVENOUS at 14:33

## 2020-02-17 RX ADMIN — MORPHINE SULFATE 4 MG: 4 INJECTION INTRAVENOUS at 12:35

## 2020-02-17 RX ADMIN — ONDANSETRON 4 MG: 2 INJECTION INTRAMUSCULAR; INTRAVENOUS at 12:35

## 2020-02-17 RX ADMIN — IOHEXOL 100 ML: 350 INJECTION, SOLUTION INTRAVENOUS at 13:00

## 2020-02-17 RX ADMIN — KETOROLAC TROMETHAMINE 30 MG: 30 INJECTION, SOLUTION INTRAMUSCULAR at 12:35

## 2020-02-17 RX ADMIN — SODIUM CHLORIDE 1000 ML: 9 INJECTION, SOLUTION INTRAVENOUS at 13:33

## 2020-02-17 SDOH — HEALTH STABILITY: MENTAL HEALTH: HOW OFTEN DO YOU HAVE A DRINK CONTAINING ALCOHOL?: 2-4 TIMES A MONTH

## 2020-02-17 NOTE — ED TRIAGE NOTES
"Sanjeev Pop 34 y.o. male   Chief Complaint   Patient presents with   • Abdominal Pain     Sharp right sided abdominal pain that started 2 hours ago; pt had just eaten something and he stood up to walk and felt the suddent onset; radiates to back   • N/V     vomiting for last 2 hours     /93   Pulse 66   Temp 36.6 °C (97.9 °F) (Temporal)   Resp 19   Ht 1.803 m (5' 11\")   Wt 104.1 kg (229 lb 8 oz)   SpO2 97%   BMI 32.01 kg/m²     Pt returned to [facility] and educated on triage process. Advised to notify RN with changes or concerns.   Pt given emesis bag. Denies any dysuria. C/o pain radiating to right flank.   "

## 2020-02-17 NOTE — ED NOTES
IV removed with catheter intact. Discharge paperwork discussed with patient. Patient verbalized understanding.

## 2020-02-18 NOTE — ED PROVIDER NOTES
ED Provider Note    CHIEF COMPLAINT  Chief Complaint   Patient presents with   • Abdominal Pain     Sharp right sided abdominal pain that started 2 hours ago; pt had just eaten something and he stood up to walk and felt the suddent onset; radiates to back   • N/V     vomiting for last 2 hours       HPI  Sanjeev Tim Pop is a 34 y.o. male who presents to the emergency room today with sudden onset of right lower quadrant stabbing abdominal pain.  This occurred around 9 AM while he was already up he denies any injury to the area.  Pain is severe at a 10/10 radiates slightly to the side had episode nausea and vomiting.  Denies changes of bowel or bladder no fever chills no chest pain or shortness of breath.    REVIEW OF SYSTEMS  See HPI for further details. All other systems are negative.     PAST MEDICAL HISTORY  Past Medical History:   Diagnosis Date   • Head ache    • Headache(784.0)    • Migraine        FAMILY HISTORY  [unfilled]    SOCIAL HISTORY  Social History     Socioeconomic History   • Marital status: Single     Spouse name: Not on file   • Number of children: Not on file   • Years of education: Not on file   • Highest education level: Not on file   Occupational History   • Not on file   Social Needs   • Financial resource strain: Not on file   • Food insecurity     Worry: Not on file     Inability: Not on file   • Transportation needs     Medical: Not on file     Non-medical: Not on file   Tobacco Use   • Smoking status: Never Smoker   • Smokeless tobacco: Never Used   Substance and Sexual Activity   • Alcohol use: Yes     Frequency: 2-4 times a month   • Drug use: Never   • Sexual activity: Not on file   Lifestyle   • Physical activity     Days per week: Not on file     Minutes per session: Not on file   • Stress: Not on file   Relationships   • Social connections     Talks on phone: Not on file     Gets together: Not on file     Attends [employer] service: Not on file     Active member of club or  "organization: Not on file     Attends meetings of clubs or organizations: Not on file     Relationship status: Not on file   • Intimate partner violence     Fear of current or ex partner: Not on file     Emotionally abused: Not on file     Physically abused: Not on file     Forced sexual activity: Not on file   Other Topics Concern   • Not on file   Social History Narrative   • Not on file       SURGICAL HISTORY  No past surgical history on file.    CURRENT MEDICATIONS  Home Medications     Reviewed by Luis Alfredo Casillas (Pharmacy Tech) on 02/17/20 at 1446  Med List Status: Complete   Medication Last Dose Status        Patient Kranthi Taking any Medications                       ALLERGIES  Allergies   Allergen Reactions   • Pcn [Penicillins]        PHYSICAL EXAM  VITAL SIGNS: /73   Pulse 75   Temp 36.6 °C (97.9 °F) (Temporal)   Resp 19   Ht 1.803 m (5' 11\")   Wt 104.1 kg (229 lb 8 oz)   SpO2 98%   BMI 32.01 kg/m²       Constitutional: Well developed, Well nourished,  acute distress, Non-toxic appearance.   HENT: Normocephalic, Atraumatic, Bilateral external ears normal, Oropharynx moist, No oral exudates, Nose normal.   Eyes: PERRLA, EOMI, Conjunctiva normal, No discharge.   Neck: Normal range of motion, No tenderness, Supple, No stridor.   Lymphatic: No lymphadenopathy noted.   Cardiovascular: Normal heart rate, Normal rhythm, No murmurs, No rubs, No gallops.   Thorax & Lungs: Normal breath sounds, No respiratory distress, No wheezing, No chest tenderness.   Abdomen: Bowel sounds normal, Soft, right lower quadrant tenderness, No masses, No pulsatile masses.  No rebound, guarding or peritoneal signs noted  Skin: Warm, Dry, No erythema, No rash.   Back: No tenderness, No CVA tenderness.    Extremities: Intact distal pulses, No edema, No tenderness, No cyanosis, No clubbing.   Musculoskeletal: Good range of motion in all major joints. No tenderness to palpation or major deformities noted.   Neurologic: " Alert & oriented x 3, Normal motor function, Normal sensory function, No focal deficits noted.   Psychiatric: Affect normal, Judgment normal, Mood normal.         RADIOLOGY/PROCEDURES  CT-ABDOMEN-PELVIS WITH   Final Result      1.  3 mm stone at the RIGHT UVJ with mild-to-moderate obstructive changes.   2.  Punctate nonobstructing bilateral kidney stones.   3.  Normal appendix.            COURSE & MEDICAL DECISION MAKING  Pertinent Labs & Imaging studies reviewed. (See chart for details)  Patient has a distal UVJ junction stone 3 mm consistent with history differential diagnosis included appendicitis and appendix was visualized there is no evidence of acute process there pain is being caused by the kidney stone there is mild to moderate obstruction patient was given Toradol/morphine with relief of the symptoms on reexamination is resting comfortably feels much improved.  Given referral to Dr. Pollock, urology, urine did not show evidence of infection patient is afebrile does not appear septic.  He was given strainer for the urine placed on Flomax, Motrin 800 mg, Percocet and Zofran.  He understands that Percocet is opiate-based and can be addictive no drive or operate him machinery no use of alcohol medication try alternatives first as above.  Increase fluid intake return if fever, increased pain is not covered or relieved with medication or inability urinate or further problems or next 2440 hrs. otherwise will follow-up as directed.    FINAL IMPRESSION  1.  Acute right lower quadrant abdominal pain  2.  3 mm right renal calculus, UVJ junction  3.  Nausea and vomiting         Electronically signed by: Miguel Angel Siddiqi D.O., 2/17/2020 6:12 PM

## 2024-10-08 SDOH — HEALTH STABILITY: PHYSICAL HEALTH: ON AVERAGE, HOW MANY MINUTES DO YOU ENGAGE IN EXERCISE AT THIS LEVEL?: 60 MIN

## 2024-10-08 SDOH — ECONOMIC STABILITY: INCOME INSECURITY: IN THE LAST 12 MONTHS, WAS THERE A TIME WHEN YOU WERE NOT ABLE TO PAY THE MORTGAGE OR RENT ON TIME?: NO

## 2024-10-08 SDOH — HEALTH STABILITY: PHYSICAL HEALTH: ON AVERAGE, HOW MANY DAYS PER WEEK DO YOU ENGAGE IN MODERATE TO STRENUOUS EXERCISE (LIKE A BRISK WALK)?: 7 DAYS

## 2024-10-08 SDOH — ECONOMIC STABILITY: FOOD INSECURITY: WITHIN THE PAST 12 MONTHS, THE FOOD YOU BOUGHT JUST DIDN'T LAST AND YOU DIDN'T HAVE MONEY TO GET MORE.: NEVER TRUE

## 2024-10-08 SDOH — ECONOMIC STABILITY: TRANSPORTATION INSECURITY
IN THE PAST 12 MONTHS, HAS LACK OF RELIABLE TRANSPORTATION KEPT YOU FROM MEDICAL APPOINTMENTS, MEETINGS, WORK OR FROM GETTING THINGS NEEDED FOR DAILY LIVING?: NO

## 2024-10-08 SDOH — ECONOMIC STABILITY: TRANSPORTATION INSECURITY
IN THE PAST 12 MONTHS, HAS THE LACK OF TRANSPORTATION KEPT YOU FROM MEDICAL APPOINTMENTS OR FROM GETTING MEDICATIONS?: NO

## 2024-10-08 SDOH — HEALTH STABILITY: MENTAL HEALTH
STRESS IS WHEN SOMEONE FEELS TENSE, NERVOUS, ANXIOUS, OR CAN'T SLEEP AT NIGHT BECAUSE THEIR MIND IS TROUBLED. HOW STRESSED ARE YOU?: TO SOME EXTENT

## 2024-10-08 SDOH — ECONOMIC STABILITY: FOOD INSECURITY: WITHIN THE PAST 12 MONTHS, YOU WORRIED THAT YOUR FOOD WOULD RUN OUT BEFORE YOU GOT MONEY TO BUY MORE.: NEVER TRUE

## 2024-10-08 SDOH — ECONOMIC STABILITY: INCOME INSECURITY: HOW HARD IS IT FOR YOU TO PAY FOR THE VERY BASICS LIKE FOOD, HOUSING, MEDICAL CARE, AND HEATING?: NOT HARD AT ALL

## 2024-10-08 SDOH — ECONOMIC STABILITY: TRANSPORTATION INSECURITY
IN THE PAST 12 MONTHS, HAS LACK OF TRANSPORTATION KEPT YOU FROM MEETINGS, WORK, OR FROM GETTING THINGS NEEDED FOR DAILY LIVING?: NO

## 2024-10-08 SDOH — ECONOMIC STABILITY: HOUSING INSECURITY
IN THE LAST 12 MONTHS, WAS THERE A TIME WHEN YOU DID NOT HAVE A STEADY PLACE TO SLEEP OR SLEPT IN A SHELTER (INCLUDING NOW)?: NO

## 2024-10-08 ASSESSMENT — SOCIAL DETERMINANTS OF HEALTH (SDOH)
HOW OFTEN DO YOU GET TOGETHER WITH FRIENDS OR RELATIVES?: MORE THAN THREE TIMES A WEEK
ARE YOU MARRIED, WIDOWED, DIVORCED, SEPARATED, NEVER MARRIED, OR LIVING WITH A PARTNER?: NEVER MARRIED
HOW OFTEN DO YOU HAVE A DRINK CONTAINING ALCOHOL: NEVER
HOW OFTEN DO YOU GET TOGETHER WITH FRIENDS OR RELATIVES?: MORE THAN THREE TIMES A WEEK
IN A TYPICAL WEEK, HOW MANY TIMES DO YOU TALK ON THE PHONE WITH FAMILY, FRIENDS, OR NEIGHBORS?: MORE THAN THREE TIMES A WEEK
IN THE PAST 12 MONTHS, HAS THE ELECTRIC, GAS, OIL, OR WATER COMPANY THREATENED TO SHUT OFF SERVICE IN YOUR HOME?: NO
HOW OFTEN DO YOU ATTENT MEETINGS OF THE CLUB OR ORGANIZATION YOU BELONG TO?: NEVER
ARE YOU MARRIED, WIDOWED, DIVORCED, SEPARATED, NEVER MARRIED, OR LIVING WITH A PARTNER?: NEVER MARRIED
HOW MANY DRINKS CONTAINING ALCOHOL DO YOU HAVE ON A TYPICAL DAY WHEN YOU ARE DRINKING: PATIENT DOES NOT DRINK
HOW HARD IS IT FOR YOU TO PAY FOR THE VERY BASICS LIKE FOOD, HOUSING, MEDICAL CARE, AND HEATING?: NOT HARD AT ALL
HOW OFTEN DO YOU ATTEND CHURCH OR RELIGIOUS SERVICES?: NEVER
HOW OFTEN DO YOU HAVE SIX OR MORE DRINKS ON ONE OCCASION: NEVER
WITHIN THE PAST 12 MONTHS, YOU WORRIED THAT YOUR FOOD WOULD RUN OUT BEFORE YOU GOT THE MONEY TO BUY MORE: NEVER TRUE
HOW OFTEN DO YOU ATTENT MEETINGS OF THE CLUB OR ORGANIZATION YOU BELONG TO?: NEVER
DO YOU BELONG TO ANY CLUBS OR ORGANIZATIONS SUCH AS CHURCH GROUPS UNIONS, FRATERNAL OR ATHLETIC GROUPS, OR SCHOOL GROUPS?: NO
HOW OFTEN DO YOU ATTEND CHURCH OR RELIGIOUS SERVICES?: NEVER
DO YOU BELONG TO ANY CLUBS OR ORGANIZATIONS SUCH AS CHURCH GROUPS UNIONS, FRATERNAL OR ATHLETIC GROUPS, OR SCHOOL GROUPS?: NO
IN A TYPICAL WEEK, HOW MANY TIMES DO YOU TALK ON THE PHONE WITH FAMILY, FRIENDS, OR NEIGHBORS?: MORE THAN THREE TIMES A WEEK

## 2024-10-08 ASSESSMENT — LIFESTYLE VARIABLES
HOW OFTEN DO YOU HAVE SIX OR MORE DRINKS ON ONE OCCASION: NEVER
HOW OFTEN DO YOU HAVE A DRINK CONTAINING ALCOHOL: NEVER
SKIP TO QUESTIONS 9-10: 1
AUDIT-C TOTAL SCORE: 0
HOW MANY STANDARD DRINKS CONTAINING ALCOHOL DO YOU HAVE ON A TYPICAL DAY: PATIENT DOES NOT DRINK

## 2024-10-11 ENCOUNTER — OFFICE VISIT (OUTPATIENT)
Dept: MEDICAL GROUP | Facility: PHYSICIAN GROUP | Age: 39
End: 2024-10-11
Payer: COMMERCIAL

## 2024-10-11 VITALS
SYSTOLIC BLOOD PRESSURE: 120 MMHG | DIASTOLIC BLOOD PRESSURE: 82 MMHG | WEIGHT: 222.44 LBS | OXYGEN SATURATION: 95 % | RESPIRATION RATE: 16 BRPM | BODY MASS INDEX: 31.14 KG/M2 | HEIGHT: 71 IN | HEART RATE: 77 BPM | TEMPERATURE: 97.6 F

## 2024-10-11 DIAGNOSIS — R53.83 OTHER FATIGUE: ICD-10-CM

## 2024-10-11 DIAGNOSIS — Z12.11 SCREENING FOR COLON CANCER: ICD-10-CM

## 2024-10-11 DIAGNOSIS — Z23 NEED FOR VACCINATION: ICD-10-CM

## 2024-10-11 DIAGNOSIS — Z80.0 FAMILY HISTORY OF COLON CANCER IN FATHER: ICD-10-CM

## 2024-10-11 DIAGNOSIS — Z11.59 NEED FOR HEPATITIS C SCREENING TEST: ICD-10-CM

## 2024-10-11 PROBLEM — G43.109 MIGRAINE WITH AURA: Status: ACTIVE | Noted: 2017-09-18

## 2024-10-11 PROCEDURE — 99385 PREV VISIT NEW AGE 18-39: CPT | Mod: 25 | Performed by: STUDENT IN AN ORGANIZED HEALTH CARE EDUCATION/TRAINING PROGRAM

## 2024-10-11 PROCEDURE — 90715 TDAP VACCINE 7 YRS/> IM: CPT | Performed by: STUDENT IN AN ORGANIZED HEALTH CARE EDUCATION/TRAINING PROGRAM

## 2024-10-11 PROCEDURE — 90471 IMMUNIZATION ADMIN: CPT | Performed by: STUDENT IN AN ORGANIZED HEALTH CARE EDUCATION/TRAINING PROGRAM

## 2024-10-11 PROCEDURE — 3074F SYST BP LT 130 MM HG: CPT | Performed by: STUDENT IN AN ORGANIZED HEALTH CARE EDUCATION/TRAINING PROGRAM

## 2024-10-11 PROCEDURE — 99203 OFFICE O/P NEW LOW 30 MIN: CPT | Mod: 25 | Performed by: STUDENT IN AN ORGANIZED HEALTH CARE EDUCATION/TRAINING PROGRAM

## 2024-10-11 PROCEDURE — 3079F DIAST BP 80-89 MM HG: CPT | Performed by: STUDENT IN AN ORGANIZED HEALTH CARE EDUCATION/TRAINING PROGRAM

## 2024-10-11 ASSESSMENT — PATIENT HEALTH QUESTIONNAIRE - PHQ9: CLINICAL INTERPRETATION OF PHQ2 SCORE: 0

## 2024-10-11 ASSESSMENT — ENCOUNTER SYMPTOMS
FOCAL WEAKNESS: 0
SHORTNESS OF BREATH: 0
PALPITATIONS: 0
VOMITING: 0
NAUSEA: 0
ABDOMINAL PAIN: 0
HEADACHES: 0
CHILLS: 0
WHEEZING: 0
FEVER: 0
DIZZINESS: 0
COUGH: 0
ORTHOPNEA: 0